# Patient Record
Sex: FEMALE | Race: OTHER | HISPANIC OR LATINO | URBAN - METROPOLITAN AREA
[De-identification: names, ages, dates, MRNs, and addresses within clinical notes are randomized per-mention and may not be internally consistent; named-entity substitution may affect disease eponyms.]

---

## 2024-08-02 ENCOUNTER — OUTPATIENT (OUTPATIENT)
Dept: OUTPATIENT SERVICES | Facility: HOSPITAL | Age: 19
LOS: 1 days | End: 2024-08-02
Payer: SELF-PAY

## 2024-08-02 VITALS
HEART RATE: 85 BPM | OXYGEN SATURATION: 99 % | TEMPERATURE: 98 F | RESPIRATION RATE: 18 BRPM | DIASTOLIC BLOOD PRESSURE: 53 MMHG | SYSTOLIC BLOOD PRESSURE: 105 MMHG

## 2024-08-02 DIAGNOSIS — O26.899 OTHER SPECIFIED PREGNANCY RELATED CONDITIONS, UNSPECIFIED TRIMESTER: ICD-10-CM

## 2024-08-02 LAB
ALBUMIN SERPL ELPH-MCNC: 4.4 G/DL — SIGNIFICANT CHANGE UP (ref 3.3–5)
ALP SERPL-CCNC: 66 U/L — SIGNIFICANT CHANGE UP (ref 40–120)
ALT FLD-CCNC: 6 U/L — LOW (ref 10–45)
ANION GAP SERPL CALC-SCNC: 12 MMOL/L — SIGNIFICANT CHANGE UP (ref 5–17)
APPEARANCE UR: ABNORMAL
AST SERPL-CCNC: 18 U/L — SIGNIFICANT CHANGE UP (ref 10–40)
BACTERIA # UR AUTO: ABNORMAL /HPF
BASOPHILS # BLD AUTO: 0.01 K/UL — SIGNIFICANT CHANGE UP (ref 0–0.2)
BASOPHILS NFR BLD AUTO: 0.1 % — SIGNIFICANT CHANGE UP (ref 0–2)
BILIRUB SERPL-MCNC: 0.3 MG/DL — SIGNIFICANT CHANGE UP (ref 0.2–1.2)
BILIRUB UR-MCNC: ABNORMAL
BUN SERPL-MCNC: 9 MG/DL — SIGNIFICANT CHANGE UP (ref 7–23)
CALCIUM SERPL-MCNC: 9.5 MG/DL — SIGNIFICANT CHANGE UP (ref 8.4–10.5)
CAST: 4 /LPF — SIGNIFICANT CHANGE UP (ref 0–4)
CHLORIDE SERPL-SCNC: 103 MMOL/L — SIGNIFICANT CHANGE UP (ref 96–108)
CO2 SERPL-SCNC: 21 MMOL/L — LOW (ref 22–31)
COLOR SPEC: SIGNIFICANT CHANGE UP
CREAT SERPL-MCNC: 0.51 MG/DL — SIGNIFICANT CHANGE UP (ref 0.5–1.3)
DIFF PNL FLD: NEGATIVE — SIGNIFICANT CHANGE UP
EGFR: 136 ML/MIN/1.73M2 — SIGNIFICANT CHANGE UP
EOSINOPHIL # BLD AUTO: 0 K/UL — SIGNIFICANT CHANGE UP (ref 0–0.5)
EOSINOPHIL NFR BLD AUTO: 0 % — SIGNIFICANT CHANGE UP (ref 0–6)
GLUCOSE SERPL-MCNC: 91 MG/DL — SIGNIFICANT CHANGE UP (ref 70–99)
GLUCOSE UR QL: NEGATIVE MG/DL — SIGNIFICANT CHANGE UP
HCT VFR BLD CALC: 36.8 % — SIGNIFICANT CHANGE UP (ref 34.5–45)
HGB BLD-MCNC: 13.3 G/DL — SIGNIFICANT CHANGE UP (ref 11.5–15.5)
IMM GRANULOCYTES NFR BLD AUTO: 0.1 % — SIGNIFICANT CHANGE UP (ref 0–0.9)
KETONES UR-MCNC: >=160 MG/DL
LEUKOCYTE ESTERASE UR-ACNC: ABNORMAL
LIDOCAIN IGE QN: 38 U/L — SIGNIFICANT CHANGE UP (ref 7–60)
LYMPHOCYTES # BLD AUTO: 0.77 K/UL — LOW (ref 1–3.3)
LYMPHOCYTES # BLD AUTO: 11.1 % — LOW (ref 13–44)
MAGNESIUM SERPL-MCNC: 1.9 MG/DL — SIGNIFICANT CHANGE UP (ref 1.6–2.6)
MCHC RBC-ENTMCNC: 33.9 PG — SIGNIFICANT CHANGE UP (ref 27–34)
MCHC RBC-ENTMCNC: 36.1 GM/DL — HIGH (ref 32–36)
MCV RBC AUTO: 93.9 FL — SIGNIFICANT CHANGE UP (ref 80–100)
MONOCYTES # BLD AUTO: 0.17 K/UL — SIGNIFICANT CHANGE UP (ref 0–0.9)
MONOCYTES NFR BLD AUTO: 2.5 % — SIGNIFICANT CHANGE UP (ref 2–14)
MUCOUS THREADS # UR AUTO: PRESENT
NEUTROPHILS # BLD AUTO: 5.96 K/UL — SIGNIFICANT CHANGE UP (ref 1.8–7.4)
NEUTROPHILS NFR BLD AUTO: 86.2 % — HIGH (ref 43–77)
NITRITE UR-MCNC: NEGATIVE — SIGNIFICANT CHANGE UP
NRBC # BLD: 0 /100 WBCS — SIGNIFICANT CHANGE UP (ref 0–0)
PH UR: 6 — SIGNIFICANT CHANGE UP (ref 5–8)
PHOSPHATE SERPL-MCNC: 3.8 MG/DL — SIGNIFICANT CHANGE UP (ref 2.5–4.5)
PLATELET # BLD AUTO: 247 K/UL — SIGNIFICANT CHANGE UP (ref 150–400)
POTASSIUM SERPL-MCNC: 3.8 MMOL/L — SIGNIFICANT CHANGE UP (ref 3.5–5.3)
POTASSIUM SERPL-SCNC: 3.8 MMOL/L — SIGNIFICANT CHANGE UP (ref 3.5–5.3)
PROT SERPL-MCNC: 7.8 G/DL — SIGNIFICANT CHANGE UP (ref 6–8.3)
PROT UR-MCNC: 100 MG/DL
RBC # BLD: 3.92 M/UL — SIGNIFICANT CHANGE UP (ref 3.8–5.2)
RBC # FLD: 12.8 % — SIGNIFICANT CHANGE UP (ref 10.3–14.5)
RBC CASTS # UR COMP ASSIST: 2 /HPF — SIGNIFICANT CHANGE UP (ref 0–4)
SODIUM SERPL-SCNC: 136 MMOL/L — SIGNIFICANT CHANGE UP (ref 135–145)
SP GR SPEC: >1.03 — HIGH (ref 1–1.03)
SQUAMOUS # UR AUTO: 20 /HPF — HIGH (ref 0–5)
UROBILINOGEN FLD QL: 1 MG/DL — SIGNIFICANT CHANGE UP (ref 0.2–1)
WBC # BLD: 6.92 K/UL — SIGNIFICANT CHANGE UP (ref 3.8–10.5)
WBC # FLD AUTO: 6.92 K/UL — SIGNIFICANT CHANGE UP (ref 3.8–10.5)
WBC UR QL: 6 /HPF — HIGH (ref 0–5)

## 2024-08-02 PROCEDURE — 36415 COLL VENOUS BLD VENIPUNCTURE: CPT

## 2024-08-02 PROCEDURE — 85025 COMPLETE CBC W/AUTO DIFF WBC: CPT

## 2024-08-02 PROCEDURE — 83735 ASSAY OF MAGNESIUM: CPT

## 2024-08-02 PROCEDURE — 80053 COMPREHEN METABOLIC PANEL: CPT

## 2024-08-02 PROCEDURE — 96361 HYDRATE IV INFUSION ADD-ON: CPT

## 2024-08-02 PROCEDURE — 96374 THER/PROPH/DIAG INJ IV PUSH: CPT

## 2024-08-02 PROCEDURE — 81001 URINALYSIS AUTO W/SCOPE: CPT

## 2024-08-02 PROCEDURE — 99214 OFFICE O/P EST MOD 30 MIN: CPT

## 2024-08-02 PROCEDURE — 84100 ASSAY OF PHOSPHORUS: CPT

## 2024-08-02 PROCEDURE — 83690 ASSAY OF LIPASE: CPT

## 2024-08-02 PROCEDURE — 99222 1ST HOSP IP/OBS MODERATE 55: CPT

## 2024-08-02 RX ORDER — METOCLOPRAMIDE HCL 5 MG/ML
10 VIAL (ML) INJECTION ONCE
Refills: 0 | Status: COMPLETED | OUTPATIENT
Start: 2024-08-02 | End: 2024-08-02

## 2024-08-02 RX ORDER — ONDANSETRON HCL/PF 4 MG/2 ML
8 VIAL (ML) INJECTION ONCE
Refills: 0 | Status: DISCONTINUED | OUTPATIENT
Start: 2024-08-02 | End: 2024-08-02

## 2024-08-02 RX ORDER — DEXTROSE MONOHYDRATE, SODIUM CHLORIDE, SODIUM LACTATE, CALCIUM CHLORIDE, MAGNESIUM CHLORIDE 1.5; 538; 448; 18.4; 5.08 G/100ML; MG/100ML; MG/100ML; MG/100ML; MG/100ML
1000 SOLUTION INTRAPERITONEAL ONCE
Refills: 0 | Status: COMPLETED | OUTPATIENT
Start: 2024-08-02 | End: 2024-08-02

## 2024-08-02 RX ADMIN — Medication 10 MILLIGRAM(S): at 17:40

## 2024-08-02 RX ADMIN — DEXTROSE MONOHYDRATE, SODIUM CHLORIDE, SODIUM LACTATE, CALCIUM CHLORIDE, MAGNESIUM CHLORIDE 1000 MILLILITER(S): 1.5; 538; 448; 18.4; 5.08 SOLUTION INTRAPERITONEAL at 17:39

## 2024-08-02 NOTE — OB PROVIDER TRIAGE NOTE - HISTORY OF PRESENT ILLNESS
HPI:    ANTE: Spontaneous pregnancy. NIPT and anatomy scan WNL. GCT passed. GBS negative/positive. Denies HTN or thyroid disorders. EFW    OB: G1 -   GynHx: Denies fibroids, ovarian cysts, STI's, or abnormal PAP.  PMHx: Denies  PSurgHx: Denies  Medications: PNV  ALL: NKDA    BP:   HR:  Gen: NAD, A&Ox3  Abd: non-tender, gravid  LE: no swelling or pitting edema  Skin: no excoriations or rashes  Pulm: no increased WOB  SVE:    FHT: Cat 1, FHR bpm, moderate variability, + accels, - decels.  Fairborn: Miles q  TAUS: Cephalic, anterior/posterior placenta. U/S printed and attached to paper chart.     A/P: HPI: 18 yo  at 20 weeks gestation sent from the ED presenting for nausea and vomiting for the past day. Patient endorses that she has had episodes like this previously in the pregnancy, although today she was not able to keep any food or water down. Patient does not know how many episodes of vomiting she has had, although the last one was 1.5 hours before presenting to ED. Patient endorses a 6/10 headache, dizziness and an episode of blurry vision. Today pt had a scheduled outpatient appointment but was unable to attend due to immigration/social issues. Originally receives PNC at [facility]. Patient denies any abdominal pain, cramping, diarrhea, fevers, chills, eating new foods or dining out.    ANTE: PNC at [facility], Spontaneous pregnancy. NIPT and anatomy scan unknown (patient missed appointment). GCT unknown. GBS unknown. Denies HTN or thyroid disorders.   OB: G1-   2700 g. G2 - current.  GynHx: Denies fibroids, ovarian cysts, STI's, or abnormal PAP.  PMHx: Denies  PSurgHx: Denies  Medications: PNV  ALL: NKDA    BP:  96/ 48 HR: 83  Gen: NAD, A&Ox3  Abd: non-tender, gravid  LE: no swelling or pitting edema  Skin: no excoriations or rashes  Pulm: no increased WOB  SVE: deferred   bpm  TAUS: Cephalic, posterior placenta.     A/P: 18 yo  at 20 weeks presenting with nausea and vomiting with accompanying headache and blurry vision suspicious for Hyper Emesis Gravidum.   - Nausea, Vomitting: CBC, CMP, , Lipase, Mg, Phos, Reglan ordered, plan to PO challenge   - Dehydration: 1L IVF, UA pending  - Plan: Reassess patient after fluids and administration of IV reglan for HA and N/A. If symptoms subside, PO challenge. Once patient is able to hold down fluids and a regular diet, will be cleared for discharge and for follow up to her outpatient provider.    D/W CAROL SonC HPI: 20 yo  at 20 weeks gestation sent from the ED presenting for nausea and vomiting for the past day. Patient endorses that she has had episodes like this previously in the pregnancy, although today she was not able to keep any food or water down. Patient does not know how many episodes of vomiting she has had, although the last one was 1.5 hours before presenting to ED. Patient also endorses a 6/10 headache. Today pt had a scheduled outpatient appointment but was unable to attend due to immigration/social issues. Originally receives PNC at [facility]. Patient denies any abdominal pain, cramping, diarrhea, fevers, chills, eating new foods or dining out.    ANTE: PNC at [facility], Spontaneous pregnancy. NIPT and anatomy scan unknown (patient missed appointment). GCT unknown. GBS unknown. Denies HTN or thyroid disorders.   OB: G1-   2700 g. G2 - current.  GynHx: Denies fibroids, ovarian cysts, STI's, or abnormal PAP.  PMHx: Denies  PSurgHx: Denies  Medications: PNV  ALL: NKDA    BP:  96/ 48 HR: 83  Gen: NAD, A&Ox3  Abd: non-tender, gravid  LE: no swelling or pitting edema  Skin: no excoriations or rashes  Pulm: no increased WOB  SVE: deferred   bpm  TAUS: Cephalic, posterior placenta.     A/P: 20 yo  at 20 weeks presenting with nausea and vomiting with an accompanying headache for Hyper Emesis Gravidum.   - Nausea, Vomitting: CBC, CMP, Lipase, Mg, Phos, Reglan ordered, plan to PO challenge   - Dehydration: 1L IVF, UA pending  - Plan: Reassess patient after fluids and administration of IV reglan for HA and N/A. If symptoms subside, PO challenge. Once patient is able to hold down fluids and a regular diet, will be cleared for discharge and for follow up to her outpatient provider.    D/W Dr. Shay Lopez PASlyC    ADDENDUM: Patient seen s/p 1L fluid bolus and Reglan. Admits to feeling better without a headache or nausea. Patient able to PO hydrate and tolerate crackers at bedside without vomiting. Patient anxious at this time to leave due to obtaining a room at a shelter with her family, is requesting to leave. UA with elevated ketones as expected, labs unremarkable and shown below. Patient encouraged to PO hydrate, start eating crackers/bread slowly and then advance her diet and that she can take tylenol for her headache. Patient to follow up outpatient with her provider in the [place] who is associated with [facility]. Patient understands and is agreeable with the plan.    LABS:                        13.3   6.92  )-----------( 247      ( 02 Aug 2024 17:36 )             36.8     08-02    136  |  103  |  9   ----------------------------<  91  3.8   |  21<L>  |  0.51    Ca    9.5      02 Aug 2024 17:36  Phos  3.8     08-02  Mg     1.9     08-    TPro  7.8  /  Alb  4.4  /  TBili  0.3  /  DBili  x   /  AST  18  /  ALT  6<L>  /  AlkPhos  66  08-02      Urinalysis Basic - ( 02 Aug 2024 17:36 )    Color: Dark Yellow / Appearance: Cloudy / SG: >1.030 / pH: x  Gluc: 91 mg/dL / Ketone: >=160 mg/dL  / Bili: Small / Urobili: 1.0 mg/dL   Blood: x / Protein: 100 mg/dL / Nitrite: Negative   Leuk Esterase: Small / RBC: 2 /HPF / WBC 6 /HPF   Sq Epi: x / Non Sq Epi: 20 /HPF / Bacteria: Few /HPF    D/W Dr. Shay Lopez, PA-C   HPI: 18 yo  at 20 weeks gestation sent from the ED presenting for nausea and vomiting for the past day. Patient endorses that she has had episodes like this previously in the pregnancy, although today she was not able to keep any food or water down. Patient does not know how many episodes of vomiting she has had, although the last one was 1.5 hours before presenting to ED. Patient also endorses a 6/10 headache. Today pt had a scheduled outpatient appointment but was unable to attend due to immigration/social issues. Originally receives PNC at [facility]. Patient denies any abdominal pain, cramping, diarrhea, fevers, chills, eating new foods or dining out.     ANTE: PNC at [facility], Spontaneous pregnancy. NIPT and anatomy scan unknown (patient missed appointment). GCT unknown. GBS unknown. Denies HTN or thyroid disorders.   OB: G1-   2700 g. G2 - current.  GynHx: Denies fibroids, ovarian cysts, STI's, or abnormal PAP.  PMHx: Denies  PSurgHx: Denies  Medications: PNV  ALL: NKDA    BP:  96/ 48 HR: 83  Gen: NAD, A&Ox3  Abd: non-tender, gravid  LE: no swelling or pitting edema  Skin: no excoriations or rashes  Pulm: no increased WOB  SVE: deferred   bpm  TAUS: Cephalic, posterior placenta.     A/P: 18 yo  at 20 weeks presenting with nausea and vomiting with an accompanying headache for Hyper Emesis Gravidum.   - Nausea, Vomitting: CBC, CMP, Lipase, Mg, Phos, Reglan ordered, plan to PO challenge   - Dehydration: 1L IVF, UA pending  - Plan: Reassess patient after fluids and administration of IV reglan for HA and N/A. If symptoms subside, PO challenge. Once patient is able to hold down fluids and a regular diet, will be cleared for discharge and for follow up to her outpatient provider.    D/W Dr. Shay Lopez PASlyC    ADDENDUM: Patient seen s/p 1L fluid bolus and Reglan. Admits to feeling better without a headache or nausea. Patient able to PO hydrate and tolerate crackers at bedside without vomiting. Patient anxious at this time to leave due to obtaining a room at a shelter with her family, is requesting to leave. UA with elevated ketones as expected, labs unremarkable and shown below. Patient encouraged to PO hydrate, start eating crackers/bread slowly and then advance her diet and that she can take tylenol for her headache. Patient to follow up outpatient with her provider in the [place] who is associated with [facility]. Patient understands and is agreeable with the plan.    LABS:                        13.3   6.92  )-----------( 247      ( 02 Aug 2024 17:36 )             36.8     08-02    136  |  103  |  9   ----------------------------<  91  3.8   |  21<L>  |  0.51    Ca    9.5      02 Aug 2024 17:36  Phos  3.8     08-02  Mg     1.9     08-    TPro  7.8  /  Alb  4.4  /  TBili  0.3  /  DBili  x   /  AST  18  /  ALT  6<L>  /  AlkPhos  66  08-02      Urinalysis Basic - ( 02 Aug 2024 17:36 )    Color: Dark Yellow / Appearance: Cloudy / SG: >1.030 / pH: x  Gluc: 91 mg/dL / Ketone: >=160 mg/dL  / Bili: Small / Urobili: 1.0 mg/dL   Blood: x / Protein: 100 mg/dL / Nitrite: Negative   Leuk Esterase: Small / RBC: 2 /HPF / WBC 6 /HPF   Sq Epi: x / Non Sq Epi: 20 /HPF / Bacteria: Few /HPF    D/W Dr. Shay Lopez, PA-C